# Patient Record
Sex: MALE | Race: WHITE | Employment: FULL TIME | URBAN - METROPOLITAN AREA
[De-identification: names, ages, dates, MRNs, and addresses within clinical notes are randomized per-mention and may not be internally consistent; named-entity substitution may affect disease eponyms.]

---

## 2021-07-06 ENCOUNTER — HOSPITAL ENCOUNTER (OUTPATIENT)
Dept: ULTRASOUND IMAGING | Age: 26
Discharge: HOME | End: 2021-07-06
Attending: NURSE PRACTITIONER
Payer: COMMERCIAL

## 2021-07-06 ENCOUNTER — TRANSCRIBE ORDER (OUTPATIENT)
Dept: SCHEDULING | Age: 26
End: 2021-07-06

## 2021-07-06 DIAGNOSIS — N50.819 TESTICULAR PAIN: Primary | ICD-10-CM

## 2021-07-06 DIAGNOSIS — N50.9 DISORDER OF MALE GENITAL ORGAN: ICD-10-CM

## 2021-07-06 DIAGNOSIS — N50.819 TESTICULAR PAIN: ICD-10-CM

## 2021-07-06 PROCEDURE — 76870 US EXAM SCROTUM: CPT

## 2021-09-24 ENCOUNTER — HOSPITAL ENCOUNTER (OUTPATIENT)
Dept: GENERAL RADIOLOGY | Age: 26
Discharge: HOME | End: 2021-09-24

## 2021-09-24 DIAGNOSIS — N20.0 KIDNEY STONE: ICD-10-CM

## 2021-09-27 NOTE — PROGRESS NOTES
I feel pt still has small stone close to bladder. Please see how he is feeling. Recommend fluid intake and meds as needed. OV in 2 weeks for recheck.